# Patient Record
Sex: FEMALE | Race: WHITE
[De-identification: names, ages, dates, MRNs, and addresses within clinical notes are randomized per-mention and may not be internally consistent; named-entity substitution may affect disease eponyms.]

---

## 2019-07-01 ENCOUNTER — HOSPITAL ENCOUNTER (OUTPATIENT)
Dept: HOSPITAL 59 - SUR | Age: 79
Discharge: HOME | End: 2019-07-01
Attending: ORTHOPAEDIC SURGERY
Payer: MEDICARE

## 2019-07-01 DIAGNOSIS — G62.9: ICD-10-CM

## 2019-07-01 DIAGNOSIS — E11.9: ICD-10-CM

## 2019-07-01 DIAGNOSIS — D16.11: Primary | ICD-10-CM

## 2019-07-01 DIAGNOSIS — K90.0: ICD-10-CM

## 2019-07-01 DIAGNOSIS — I10: ICD-10-CM

## 2019-07-01 PROCEDURE — 36416 COLLJ CAPILLARY BLOOD SPEC: CPT

## 2019-07-01 PROCEDURE — 82948 REAGENT STRIP/BLOOD GLUCOSE: CPT

## 2019-07-05 NOTE — OPERATIVE NOTE
DATE OF SURGERY: 07/01/2019



PREOPERATIVE DIAGNOSIS: Right index fingertip calcific mass. 



POSTOPERATIVE DIAGNOSIS: Right index fingertip calcific mass. 



OPERATION: Excision of right index finger calcific mass. 



SURGEON: Ruddy Healy MD 



ANESTHESIA: Sedation and digital block. 



COMPLICATIONS: None. 



ESTIMATED BLOOD LOSS: Minimal. 



FINDINGS: A pellet-sized calcific globular mass off the tip of the index finger 
tuft. Sent to pathology. 



INDICATION: This is an 79-year-old female who has had persistent pain and callus
and deformity in her right index finger for several months. Protrusion out the 
fingertip forming a callus. X-ray in the office showed calcific mass there of 
the tuft. Scheduled for procedure above. Explained the risks and benefits in 
detail for the diagnosis and procedure including but not limited to infection, 
nerve injury, vessel injury, persistent pain, stiffness, numbness, tingling, 
recurrence of callus, need for further procedures. All of her questions were 
answered. Rehab and healing course were outlined. Agreed to proceed.



PROCEDURE: The patient brought to the OR, placed in the supine position. 
Sedation was given. Preop antibiotics given. Her right hand index finger prepped
and draped in sterile fashion. Prepped again with Chloraprep after it was 
draped. Intraoperative timeout was performed. 



Next, we localized the callus off the radial aspect of the index fingertip. Made
a longitudinal incision just dorsal to the midline of the fingertip extending 
around to below the callus and then made an incision above the callus in 
triangular flap fashion. Opened up this calcific deposit, dissected down to the 
tuft, elevated the nail plate partially distally as well and then grasped the 
calcific nodular mass which was about pellet size with a Rongeur and removed it.
Trimmed off remaining bone at the end of the tuft and had complete removal. Sent
to pathology. Irrigated copiously and removed some of the callus. Excised and 
then closed with 3-0 nylon. Sterile dressing applied. Digital block was 
previously performed.  



The patient tolerated procedure well. No intraoperative complications. Sponge 
and blade counts correct. Recovery stable. Neurovascularly intact. Discharge as 
an outpatient. Follow up in 3 weeks. CC: Dr. Kirstie ALDRICH